# Patient Record
Sex: FEMALE | Race: WHITE | NOT HISPANIC OR LATINO | Employment: OTHER | URBAN - METROPOLITAN AREA
[De-identification: names, ages, dates, MRNs, and addresses within clinical notes are randomized per-mention and may not be internally consistent; named-entity substitution may affect disease eponyms.]

---

## 2017-03-16 ENCOUNTER — TELEPHONE (OUTPATIENT)
Dept: HEMATOLOGY/ONCOLOGY | Facility: CLINIC | Age: 82
End: 2017-03-16

## 2017-03-16 NOTE — TELEPHONE ENCOUNTER
----- Message from Phong Isaac sent at 3/16/2017  2:38 PM CDT -----  Pt needs to schedule lab work and appt. Pt called us at [facility] because she also needed appt with Reta Mccartney and a mmg. I scheduled her for 4/11/17 at 9:20am for mmg and 10:00am for CBE with Reta. Please call pt regarding scheduling labs and appt with Dr. Rebolledo on same day due to pt coming from out of [facility]. Pt can be reached at [phone]

## 2017-04-04 ENCOUNTER — OFFICE VISIT (OUTPATIENT)
Dept: OPHTHALMOLOGY | Facility: CLINIC | Age: 82
End: 2017-04-04
Payer: MEDICARE

## 2017-04-04 DIAGNOSIS — H50.22 HYPERTROPIA OF LEFT EYE: ICD-10-CM

## 2017-04-04 DIAGNOSIS — H50.10 EXOTROPIA: Primary | ICD-10-CM

## 2017-04-04 PROCEDURE — 92060 SENSORIMOTOR EXAMINATION: CPT | Mod: PBBFAC | Performed by: OPHTHALMOLOGY

## 2017-04-04 PROCEDURE — 99999 PR PBB SHADOW E&M-EST. PATIENT-LVL I: CPT | Mod: PBBFAC,,, | Performed by: OPHTHALMOLOGY

## 2017-04-04 PROCEDURE — 92060 SENSORIMOTOR EXAMINATION: CPT | Mod: 26,S$PBB,, | Performed by: OPHTHALMOLOGY

## 2017-04-04 PROCEDURE — 92014 COMPRE OPH EXAM EST PT 1/>: CPT | Mod: S$PBB,,, | Performed by: OPHTHALMOLOGY

## 2017-04-04 PROCEDURE — 99211 OFF/OP EST MAY X REQ PHY/QHP: CPT | Mod: PBBFAC | Performed by: OPHTHALMOLOGY

## 2017-04-04 NOTE — PROGRESS NOTES
HPI     Concerns About Ocular Health    Additional comments: Pt here for ocular health exam           Comments   83 yo female whom states her vision is doing pretty good except when she   is reading she finds that the lines run together sometimes one on top of   the other.  Does notice some trouble with focusing at a distance also with   things not coming together.   Blurred vision, uncontrolled DM   Meds:  None        Last edited by COY Alberto Jr., MD on 4/4/2017 11:52 AM. (History)        ROS     Positive for: Endocrine, Eyes    Last edited by COY Alberto Jr., MD on 4/4/2017 11:52 AM. (History)        Assessment /Plan     For exam results, see Encounter Report.    Exotropia    Hypertropia of left eye      Same prism need  rx new specs  Refer to [facility] for DM retina check

## 2017-04-11 ENCOUNTER — HOSPITAL ENCOUNTER (OUTPATIENT)
Dept: RADIOLOGY | Facility: HOSPITAL | Age: 82
Discharge: HOME | End: 2017-04-11
Attending: INTERNAL MEDICINE
Payer: MEDICARE

## 2017-04-11 ENCOUNTER — OFFICE VISIT (OUTPATIENT)
Dept: HEMATOLOGY/ONCOLOGY | Facility: CLINIC | Age: 82
End: 2017-04-11
Payer: MEDICARE

## 2017-04-11 ENCOUNTER — OFFICE VISIT (OUTPATIENT)
Dept: SURGERY | Facility: CLINIC | Age: 82
End: 2017-04-11
Payer: MEDICARE

## 2017-04-11 VITALS
TEMPERATURE: 98 F | BODY MASS INDEX: 18.26 KG/M2 | SYSTOLIC BLOOD PRESSURE: 129 MMHG | HEART RATE: 87 BPM | WEIGHT: 103.06 LBS | DIASTOLIC BLOOD PRESSURE: 63 MMHG

## 2017-04-11 VITALS
OXYGEN SATURATION: 96 % | HEIGHT: 63 IN | DIASTOLIC BLOOD PRESSURE: 60 MMHG | RESPIRATION RATE: 16 BRPM | WEIGHT: 102.5 LBS | SYSTOLIC BLOOD PRESSURE: 128 MMHG | HEART RATE: 73 BPM | BODY MASS INDEX: 18.16 KG/M2 | TEMPERATURE: 98 F

## 2017-04-11 DIAGNOSIS — R63.4 WEIGHT LOSS: ICD-10-CM

## 2017-04-11 DIAGNOSIS — Z85.3 PERSONAL HISTORY OF BREAST CANCER: Primary | ICD-10-CM

## 2017-04-11 DIAGNOSIS — Z85.3 HISTORY OF BREAST CANCER: Primary | ICD-10-CM

## 2017-04-11 DIAGNOSIS — Z85.3 HISTORY OF BREAST CANCER: ICD-10-CM

## 2017-04-11 PROCEDURE — 99213 OFFICE O/P EST LOW 20 MIN: CPT | Mod: S$PBB,,, | Performed by: NURSE PRACTITIONER

## 2017-04-11 PROCEDURE — 99999 PR PBB SHADOW E&M-EST. PATIENT-LVL IV: CPT | Mod: PBBFAC,,, | Performed by: NURSE PRACTITIONER

## 2017-04-11 PROCEDURE — 99213 OFFICE O/P EST LOW 20 MIN: CPT | Mod: S$PBB,,, | Performed by: INTERNAL MEDICINE

## 2017-04-11 PROCEDURE — 99999 PR PBB SHADOW E&M-EST. PATIENT-LVL IV: CPT | Mod: PBBFAC,,, | Performed by: INTERNAL MEDICINE

## 2017-04-11 PROCEDURE — 99214 OFFICE O/P EST MOD 30 MIN: CPT | Mod: PBBFAC,27 | Performed by: INTERNAL MEDICINE

## 2017-04-11 NOTE — PROGRESS NOTES
PATIENT: Rosalie Rudd  MRN: [id number]  DATE: 4/11/2017    Subjective:     Chief complaint:  Chief Complaint   Patient presents with    Breast Cancer       Oncologic History:  Ms. 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 The patient's cancer was diagnosed in 1999 and she underwent left-sided lumpectomy with axillary dissection. Tumor was moderately differentiated, ER/VA positive with 3/4 lymph nodes positive for tumor involvement.    Postoperatively, she was treated with 4 cycles of AC, then she underwent adjuvant radiation to the left breast and regional lymphatics. Radiation treatment was completed in 01/2000, then she received tamoxifen for 5 years and then after 01/2005 and then treated with letrozole, which she was    discontinued sometime in 2010. She is being followed with annual mammograms.      Interval History: Ms. Rudd returns for follow up and mammogram. Patient saw Reta Mccartney NP this morning. She discussed mammogram as breast cancer screening at this age and patient desires to be followed by clinical exam only. No mammogram done today. Patient feels good today. No complaints. She has had an 8lb weight loss over the past year. She plans to follow up with her PCP 4/25/17 and Endocrinology 5/9/17. She denies any nausea, vomiting, diarrhea, constipation, abdominal pain, loss of appetite, chest pain, shortness of breath, leg swelling, fatigue, pain, headache, dizziness, or mood changes. She is alone.      ECOG Performance Status:   ECOG SCORE    0 - Fully active-able to carry on all pre-disease performance without restriction          PMFSH: all information reviewed and updated as relevant to today's visit    Review of Systems:   Review of Systems   Constitutional: Positive for unexpected weight change. Negative for activity change, appetite change, fatigue and fever.  "  HENT: Negative for mouth sores, nosebleeds and sore throat.    Eyes: Negative for visual disturbance.   Respiratory: Negative for cough and shortness of breath.    Cardiovascular: Negative for chest pain, palpitations and leg swelling.   Gastrointestinal: Negative for abdominal pain, constipation, diarrhea, nausea and vomiting.   Genitourinary: Negative for difficulty urinating and frequency.   Musculoskeletal: Negative for arthralgias and back pain.   Skin: Negative for rash.   Neurological: Negative for dizziness, numbness and headaches.   Hematological: Negative for adenopathy. Does not bruise/bleed easily.   Psychiatric/Behavioral: Negative for confusion and sleep disturbance. The patient is not nervous/anxious.    All other systems reviewed and are negative.        Objective:      Vitals:   Vitals:    04/11/17 1110   BP: 128/60   Pulse: 73   Resp: 16   Temp: 97.8 °F (36.6 °C)   TempSrc: Oral   SpO2: 96%   Weight: 46.5 kg (102 lb 8.2 oz)   Height: 5' 3" (1.6 m)     BMI: Body mass index is 18.16 kg/(m^2).      Physical Exam:   Physical Exam   Constitutional: She is oriented to person, place, and time. She appears well-developed and well-nourished. No distress.   HENT:   Head: Normocephalic.   Right Ear: External ear normal.   Left Ear: External ear normal.   Mouth/Throat: No oropharyngeal exudate.   No sinus tenderness.   Eyes: Conjunctivae and lids are normal. Pupils are equal, round, and reactive to light. No scleral icterus.   Neck: Trachea normal and normal range of motion. Neck supple. No thyromegaly present.   Cardiovascular: Normal rate, regular rhythm and normal heart sounds.    Pulmonary/Chest: Effort normal and breath sounds normal. Right breast exhibits no inverted nipple, no mass, no nipple discharge, no skin change and no tenderness. Left breast exhibits no inverted nipple, no mass, no nipple discharge, no skin change and no tenderness.   Abdominal: Soft. Normal appearance and bowel sounds are " normal. She exhibits no distension and no mass. There is no tenderness.   Musculoskeletal: Normal range of motion.   Lymphadenopathy:        Head (right side): No submental and no submandibular adenopathy present.        Head (left side): No submental and no submandibular adenopathy present.     She has no cervical adenopathy.   Neurological: She is alert and oriented to person, place, and time. She has normal strength and normal reflexes. No cranial nerve deficit. Gait normal.   Skin: Skin is warm, dry and intact. No bruising and no rash noted. No cyanosis. Nails show no clubbing.   Psychiatric: She has a normal mood and affect. Her speech is normal and behavior is normal.   Nursing note and vitals reviewed.        Laboratory Data:  WBC   Date Value Ref Range Status   04/04/2016 14.84 (H) 3.90 - 12.70 K/uL Final     Hemoglobin   Date Value Ref Range Status   04/04/2016 13.3 12.0 - 16.0 g/dL Final     Hematocrit   Date Value Ref Range Status   04/04/2016 42.1 37.0 - 48.5 % Final     Platelets   Date Value Ref Range Status   04/04/2016 301 150 - 350 K/uL Final     Gran #   Date Value Ref Range Status   04/04/2016 11.6 (H) 1.8 - 7.7 K/uL Final     Comment:     The ANC is based on a white cell differential from an   automated cell counter. It has not been microscopically   reviewed for the presence of abnormal cells. Clinical   correlation is required.         Chemistry        Component Value Date/Time     04/04/2016 1243    K 4.7 04/04/2016 1243    CL 95 04/04/2016 1243    CO2 30 (H) 04/04/2016 1243    BUN 16 04/04/2016 1243    CREATININE 1.0 04/04/2016 1243     (H) 04/04/2016 1243        Component Value Date/Time    CALCIUM 9.8 04/04/2016 1243    ALKPHOS 94 04/04/2016 1243    AST 24 04/04/2016 1243    ALT 25 04/04/2016 1243    BILITOT 0.9 04/04/2016 1243              Assessment/Plan:     1. History of breast cancer    2. Weight loss        Plan:    1. Again discussed mammogram as breast cancer screening  at patient's age and patient desires to be followed by clinical exam only. Encouraged to follow up with breast surgeon yearly as scheduled. We will see prn.   2. Follow up with PCP and Endocrinology as scheduled.       More than 30 mins were spent during this encounter, greater than 50% was spent in direct counseling and/or coordination of care.   Patient was also seen and examined by Dr. Rebolledo who agrees with above plan. Patient is in agreement with the proposed treatment plan. All questions were answered to the patient's satisfaction. Pt knows to call clinic if anything is needed before the next clinic visit.    Brii Kiran, AMANDAP-C  Hematology and Medical Oncology      STAFF NOTE:  I have reviewed the notes, assessments, and/or procedures performed by the nurse practitioner, as above.  I have personally interviewed the patient at the beside, and rounded with the nurse practitioner. I formulated the plan of care.  I concur with her documentation of Rosalie Rudd.

## 2017-04-11 NOTE — PROGRESS NOTES
"Subjective:      Patient ID: Rosalie Rudd is a 84 y.o. female.    Chief Complaint: Breast Cancer Screening (CBE)      HPI: (PF, EPF - 1-3) (Detailed, Comp, - 4)  returning patient who resides in [place] presents for her breast cancer surveillance. Patient denies breast pain, nipple discharge, redness, increased warmth, new onset bone pain .  History of lymphedema, states her left arm is "less swollen". Reports weight loss, being evaluated by her PCP in [place].      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   Respiratory: Negative for cough and shortness of breath.    Cardiovascular: Negative for chest pain.   Musculoskeletal: Negative for back pain.     Objective:   Physical Exam   Pulmonary/Chest: She exhibits no mass, no tenderness, no laceration, no edema and no swelling. Right breast exhibits no inverted nipple, no mass, no nipple discharge, no skin change and no tenderness. Left breast exhibits no inverted nipple, no nipple discharge, no skin change and no tenderness. There is no breast swelling.   Lumpectomy scar left UOQ with fibrosis, there is a hard, round scar tissue measuring 2mm at the lateral edge of lumpectomy scar, unchanged per patient    Lymphadenopathy:     She has no cervical adenopathy.     She has no axillary adenopathy.        Right: No supraclavicular adenopathy present.        Left: No supraclavicular adenopathy present.     Assessment:       1. Personal history of breast cancer        Plan:       Clinically LALI. Discussed mammogram as breast cancer screening at her age, she desires to be followed by clinical exam only. She also has a f/u appt with Dr Rebolledo today.   At this time, she plans on continued f/u here  Return in one " year CBE only. I would recommend f/u with either myself or Dr Rebolledo, but both are not necessary with her history of breast cancer 1999 and clinically ALLI.

## 2017-05-16 ENCOUNTER — OFFICE VISIT (OUTPATIENT)
Dept: DERMATOLOGY | Facility: CLINIC | Age: 82
End: 2017-05-16
Payer: MEDICARE

## 2017-05-16 DIAGNOSIS — L57.0 AK (ACTINIC KERATOSIS): Primary | ICD-10-CM

## 2017-05-16 DIAGNOSIS — Z85.828 PERSONAL HISTORY OF OTHER MALIGNANT NEOPLASM OF SKIN: ICD-10-CM

## 2017-05-16 DIAGNOSIS — D18.00 ANGIOMA: ICD-10-CM

## 2017-05-16 DIAGNOSIS — L82.1 SK (SEBORRHEIC KERATOSIS): ICD-10-CM

## 2017-05-16 DIAGNOSIS — L72.0 MILIA: ICD-10-CM

## 2017-05-16 PROCEDURE — 99999 PR PBB SHADOW E&M-EST. PATIENT-LVL II: CPT | Mod: PBBFAC,,, | Performed by: DERMATOLOGY

## 2017-05-16 PROCEDURE — 17000 DESTRUCT PREMALG LESION: CPT | Mod: S$PBB,,, | Performed by: DERMATOLOGY

## 2017-05-16 PROCEDURE — 99212 OFFICE O/P EST SF 10 MIN: CPT | Mod: PBBFAC | Performed by: DERMATOLOGY

## 2017-05-16 PROCEDURE — 17003 DESTRUCT PREMALG LES 2-14: CPT | Mod: PBBFAC | Performed by: DERMATOLOGY

## 2017-05-16 PROCEDURE — 17000 DESTRUCT PREMALG LESION: CPT | Mod: PBBFAC | Performed by: DERMATOLOGY

## 2017-05-16 PROCEDURE — 99213 OFFICE O/P EST LOW 20 MIN: CPT | Mod: 25,S$PBB,, | Performed by: DERMATOLOGY

## 2017-05-16 PROCEDURE — 17003 DESTRUCT PREMALG LES 2-14: CPT | Mod: S$PBB,,, | Performed by: DERMATOLOGY

## 2017-05-16 RX ORDER — GLIMEPIRIDE 4 MG/1
TABLET ORAL
COMMUNITY
Start: 2017-05-15

## 2017-05-16 RX ORDER — BENZONATATE 100 MG/1
CAPSULE ORAL
COMMUNITY
Start: 2017-05-10

## 2017-05-16 RX ORDER — EMPAGLIFLOZIN AND LINAGLIPTIN 25; 5 MG/1; MG/1
TABLET, FILM COATED ORAL
COMMUNITY
Start: 2017-05-15

## 2017-05-16 NOTE — PATIENT INSTRUCTIONS

## 2017-05-16 NOTE — PROGRESS NOTES
Subjective:       Patient ID:  Rosalie Rudd is a 84 y.o. female who presents for   Chief Complaint   Patient presents with    Skin Check     UBSE     HPI Comments: History of Present Illness: The patient presents for follow up of skin check.    The patient was last seen on: 4/4/16 for skin check  This is a high risk patient here to check for the development of new lesions.      Other skin complaints: none        Review of Systems   Skin: Positive for daily sunscreen use and activity-related sunscreen use. Negative for recent sunburn.   Hematologic/Lymphatic: Bruises/bleeds easily (on coumadin).        Objective:    Physical Exam   Constitutional: She appears well-developed and well-nourished. No distress.   Neurological: She is alert and oriented to person, place, and time. She is not disoriented.   Psychiatric: She has a normal mood and affect.   Skin:   Areas Examined (abnormalities noted in diagram):   Scalp / Hair Palpated and Inspected  Head / Face Inspection Performed  Neck Inspection Performed  Chest / Axilla Inspection Performed  Back Inspection Performed  RUE Inspected  LUE Inspection Performed                   Diagram Legend     Erythematous scaling macule/papule c/w actinic keratosis       Vascular papule c/w angioma      Pigmented verrucoid papule/plaque c/w seborrheic keratosis      Yellow umbilicated papule c/w sebaceous hyperplasia      Irregularly shaped tan macule c/w lentigo     1-2 mm smooth white papules consistent with Milia      Movable subcutaneous cyst with punctum c/w epidermal inclusion cyst      Subcutaneous movable cyst c/w pilar cyst      Firm pink to brown papule c/w dermatofibroma      Pedunculated fleshy papule(s) c/w skin tag(s)      Evenly pigmented macule c/w junctional nevus     Mildly variegated pigmented, slightly irregular-bordered macule c/w mildly atypical nevus      Flesh colored to evenly pigmented papule c/w intradermal nevus       Pink pearly papule/plaque c/w basal  cell carcinoma      Erythematous hyperkeratotic cursted plaque c/w SCC      Surgical scar with no sign of skin cancer recurrence      Open and closed comedones      Inflammatory papules and pustules      Verrucoid papule consistent consistent with wart     Erythematous eczematous patches and plaques     Dystrophic onycholytic nail with subungual debris c/w onychomycosis     Umbilicated papule    Erythematous-base heme-crusted tan verrucoid plaque consistent with inflamed seborrheic keratosis     Erythematous Silvery Scaling Plaque c/w Psoriasis     See annotation      Assessment / Plan:        AK (actinic keratosis)  Cryosurgery Procedure Note    Verbal consent from the patient is obtained and the patient is aware of the precancerous quality and need for treatment of these lesions. Liquid nitrogen cryosurgery is applied to the 2 actinic keratoses, as detailed in the physical exam, to produce a freeze injury. The patient is aware that blisters may form and is instructed on wound care with gentle cleansing and use of vaseline ointment to keep moist until healed. The patient is supplied a handout on cryosurgery and is instructed to call if lesions do not completely resolve.      SK (seborrheic keratosis)   - stable and chronic      Angioma   - stable and chronic    Milia  Reassurance given to patient. No treatment is necessary.   Treatment of benign, asymptomatic lesions may be considered cosmetic.      Personal history of other malignant neoplasm of skin  Area(s) of previous NMSC evaluated with no signs of recurrence.    Upper body skin examination performed today including at least 6 points as noted in physical examination. No lesions suspicious for malignancy noted.             Return in about 1 year (around 5/16/2018).

## 2017-05-30 ENCOUNTER — TELEPHONE (OUTPATIENT)
Dept: SURGERY | Facility: CLINIC | Age: 82
End: 2017-05-30

## 2017-05-30 NOTE — TELEPHONE ENCOUNTER
----- Message from Alfred Alfonso sent at 5/30/2017  2:41 PM CDT -----  [phone]//pt states that she needs to speak with nurse in ref to some concerns she has with her breast//please call//thank you

## 2017-05-30 NOTE — TELEPHONE ENCOUNTER
Returned the patients call regarding the message below, the patient is scheduled to be seen on Tuesday 6/6/17 at 10:15 am.  The patient voiced understanding of appointment date and time.

## 2017-06-06 ENCOUNTER — HOSPITAL ENCOUNTER (OUTPATIENT)
Dept: RADIOLOGY | Facility: HOSPITAL | Age: 82
Discharge: HOME | End: 2017-06-06
Attending: NURSE PRACTITIONER
Payer: MEDICARE

## 2017-06-06 ENCOUNTER — OFFICE VISIT (OUTPATIENT)
Dept: SURGERY | Facility: CLINIC | Age: 82
End: 2017-06-06
Payer: MEDICARE

## 2017-06-06 VITALS
TEMPERATURE: 98 F | HEIGHT: 63 IN | WEIGHT: 102.63 LBS | DIASTOLIC BLOOD PRESSURE: 66 MMHG | HEART RATE: 80 BPM | BODY MASS INDEX: 18.18 KG/M2 | SYSTOLIC BLOOD PRESSURE: 129 MMHG

## 2017-06-06 DIAGNOSIS — N63.0 BREAST MASS: ICD-10-CM

## 2017-06-06 DIAGNOSIS — Z85.3 PERSONAL HISTORY OF BREAST CANCER: ICD-10-CM

## 2017-06-06 DIAGNOSIS — N63.0 BREAST MASS: Primary | ICD-10-CM

## 2017-06-06 PROCEDURE — 77066 DX MAMMO INCL CAD BI: CPT | Mod: 26,,, | Performed by: RADIOLOGY

## 2017-06-06 PROCEDURE — 77066 DX MAMMO INCL CAD BI: CPT | Mod: TC

## 2017-06-06 PROCEDURE — 99213 OFFICE O/P EST LOW 20 MIN: CPT | Mod: S$PBB,,, | Performed by: NURSE PRACTITIONER

## 2017-06-06 PROCEDURE — 99999 PR PBB SHADOW E&M-EST. PATIENT-LVL IV: CPT | Mod: PBBFAC,,, | Performed by: NURSE PRACTITIONER

## 2017-06-06 PROCEDURE — 77062 BREAST TOMOSYNTHESIS BI: CPT | Mod: 26,,, | Performed by: RADIOLOGY

## 2017-06-06 RX ORDER — DIGOXIN 250 UG/1
TABLET ORAL
COMMUNITY
Start: 2017-05-15

## 2017-06-06 RX ORDER — MEGESTROL ACETATE 40 MG/ML
SUSPENSION ORAL
COMMUNITY
Start: 2017-03-21

## 2017-06-06 NOTE — PROGRESS NOTES
"Subjective:      Patient ID: Rosalie Rudd is a 85 y.o. female.    Chief Complaint: Breast Mass (Left Breast) and Breast Cancer Screening (CBE/Hx of Left Breast Cancer)      HPI: (PF, EPF - 1-3) (Detailed, Comp, - 4) returning patient who resides in [place] presents with c/o a new "hard spot on the edge of my scar", she reports noticing this last week and feels different to the patient. Refer to my previous clinic note, history of 2mm area of hard scar tissue in the lateral aspect of lumpectomy scar. She opted out for yearly mammogram at last visit 4/2017. Denies nipple discharge, redness, increased warmth, new onset bone pain .  History of lymphedema, states her left arm is "less swollen".      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   Respiratory: Negative for cough and shortness of breath.    Cardiovascular: Negative for chest pain.   Musculoskeletal: Negative for back pain.     Objective:   Physical Exam   Pulmonary/Chest: She exhibits no mass, no tenderness, no laceration, no deformity, no swelling and no retraction. Right breast exhibits no inverted nipple, no mass, no nipple discharge, no skin change and no tenderness. Left breast exhibits no inverted nipple, no nipple discharge, no skin change and no tenderness.   Again there is a firm rounded nodule at the lateral edge of lumpectomy scar which appears unchanged, no associated skin changes. Mild lymphedema left upper arm, no redness or increased warmth    Lymphadenopathy:     She has no cervical adenopathy.     She has no axillary adenopathy.        Right: No supraclavicular adenopathy present.        Left: No supraclavicular adenopathy present.     Assessment:       1. Breast mass    2. " Personal history of breast cancer        Plan:       bilat diag mmg today, scar tissue unchanged, no new mass , her area of concern correlates with fibrosis and is unchanged compared to previous exams. Reassurance provided. She can return now in one year for CBE only, she does not desire annual mmg for breast cancer screening. Informed to call for any palpable changes or new breast concerns.